# Patient Record
Sex: MALE | Race: OTHER | NOT HISPANIC OR LATINO
[De-identification: names, ages, dates, MRNs, and addresses within clinical notes are randomized per-mention and may not be internally consistent; named-entity substitution may affect disease eponyms.]

---

## 2017-11-03 PROBLEM — Z00.00 ENCOUNTER FOR PREVENTIVE HEALTH EXAMINATION: Status: ACTIVE | Noted: 2017-11-03

## 2020-04-25 ENCOUNTER — MESSAGE (OUTPATIENT)
Age: 30
End: 2020-04-25

## 2021-05-25 ENCOUNTER — EMERGENCY (EMERGENCY)
Facility: HOSPITAL | Age: 31
LOS: 1 days | Discharge: HOME | End: 2021-05-25
Attending: EMERGENCY MEDICINE | Admitting: EMERGENCY MEDICINE
Payer: COMMERCIAL

## 2021-05-25 VITALS
RESPIRATION RATE: 20 BRPM | HEART RATE: 107 BPM | DIASTOLIC BLOOD PRESSURE: 94 MMHG | HEIGHT: 72 IN | OXYGEN SATURATION: 100 % | TEMPERATURE: 98 F | SYSTOLIC BLOOD PRESSURE: 126 MMHG

## 2021-05-25 VITALS
SYSTOLIC BLOOD PRESSURE: 125 MMHG | RESPIRATION RATE: 18 BRPM | HEART RATE: 82 BPM | OXYGEN SATURATION: 100 % | DIASTOLIC BLOOD PRESSURE: 86 MMHG

## 2021-05-25 LAB
ALBUMIN SERPL ELPH-MCNC: 4.5 G/DL — SIGNIFICANT CHANGE UP (ref 3.3–5)
ALP SERPL-CCNC: 85 U/L — SIGNIFICANT CHANGE UP (ref 40–120)
ALT FLD-CCNC: 32 U/L — SIGNIFICANT CHANGE UP (ref 4–41)
ANION GAP SERPL CALC-SCNC: 11 MMOL/L — SIGNIFICANT CHANGE UP (ref 7–14)
AST SERPL-CCNC: 20 U/L — SIGNIFICANT CHANGE UP (ref 4–40)
BASOPHILS # BLD AUTO: 0.09 K/UL — SIGNIFICANT CHANGE UP (ref 0–0.2)
BASOPHILS NFR BLD AUTO: 0.6 % — SIGNIFICANT CHANGE UP (ref 0–2)
BILIRUB SERPL-MCNC: 0.2 MG/DL — SIGNIFICANT CHANGE UP (ref 0.2–1.2)
BUN SERPL-MCNC: 23 MG/DL — SIGNIFICANT CHANGE UP (ref 7–23)
CALCIUM SERPL-MCNC: 9.7 MG/DL — SIGNIFICANT CHANGE UP (ref 8.4–10.5)
CHLORIDE SERPL-SCNC: 101 MMOL/L — SIGNIFICANT CHANGE UP (ref 98–107)
CO2 SERPL-SCNC: 28 MMOL/L — SIGNIFICANT CHANGE UP (ref 22–31)
CREAT SERPL-MCNC: 1.06 MG/DL — SIGNIFICANT CHANGE UP (ref 0.5–1.3)
EOSINOPHIL # BLD AUTO: 0.11 K/UL — SIGNIFICANT CHANGE UP (ref 0–0.5)
EOSINOPHIL NFR BLD AUTO: 0.7 % — SIGNIFICANT CHANGE UP (ref 0–6)
GLUCOSE SERPL-MCNC: 82 MG/DL — SIGNIFICANT CHANGE UP (ref 70–99)
HCT VFR BLD CALC: 49.5 % — SIGNIFICANT CHANGE UP (ref 39–50)
HGB BLD-MCNC: 15.9 G/DL — SIGNIFICANT CHANGE UP (ref 13–17)
IANC: 10.51 K/UL — HIGH (ref 1.5–8.5)
IMM GRANULOCYTES NFR BLD AUTO: 1.7 % — HIGH (ref 0–1.5)
LYMPHOCYTES # BLD AUTO: 18.6 % — SIGNIFICANT CHANGE UP (ref 13–44)
LYMPHOCYTES # BLD AUTO: 2.79 K/UL — SIGNIFICANT CHANGE UP (ref 1–3.3)
MCHC RBC-ENTMCNC: 28.2 PG — SIGNIFICANT CHANGE UP (ref 27–34)
MCHC RBC-ENTMCNC: 32.1 GM/DL — SIGNIFICANT CHANGE UP (ref 32–36)
MCV RBC AUTO: 87.9 FL — SIGNIFICANT CHANGE UP (ref 80–100)
MONOCYTES # BLD AUTO: 1.21 K/UL — HIGH (ref 0–0.9)
MONOCYTES NFR BLD AUTO: 8.1 % — SIGNIFICANT CHANGE UP (ref 2–14)
NEUTROPHILS # BLD AUTO: 10.51 K/UL — HIGH (ref 1.8–7.4)
NEUTROPHILS NFR BLD AUTO: 70.3 % — SIGNIFICANT CHANGE UP (ref 43–77)
NRBC # BLD: 0 /100 WBCS — SIGNIFICANT CHANGE UP
NRBC # FLD: 0 K/UL — SIGNIFICANT CHANGE UP
PLATELET # BLD AUTO: 300 K/UL — SIGNIFICANT CHANGE UP (ref 150–400)
POTASSIUM SERPL-MCNC: 4.2 MMOL/L — SIGNIFICANT CHANGE UP (ref 3.5–5.3)
POTASSIUM SERPL-SCNC: 4.2 MMOL/L — SIGNIFICANT CHANGE UP (ref 3.5–5.3)
PROT SERPL-MCNC: 7.3 G/DL — SIGNIFICANT CHANGE UP (ref 6–8.3)
RBC # BLD: 5.63 M/UL — SIGNIFICANT CHANGE UP (ref 4.2–5.8)
RBC # FLD: 13.1 % — SIGNIFICANT CHANGE UP (ref 10.3–14.5)
SODIUM SERPL-SCNC: 140 MMOL/L — SIGNIFICANT CHANGE UP (ref 135–145)
WBC # BLD: 14.96 K/UL — HIGH (ref 3.8–10.5)
WBC # FLD AUTO: 14.96 K/UL — HIGH (ref 3.8–10.5)

## 2021-05-25 PROCEDURE — 99284 EMERGENCY DEPT VISIT MOD MDM: CPT

## 2021-05-25 NOTE — ED PROVIDER NOTE - PHYSICAL EXAMINATION
Neuro: symmetrical face, CN 2-12 intact, sensation intact, no pronator drift, gait normal, tandem gait normal, finger to nose normal

## 2021-05-25 NOTE — ED ADULT NURSE NOTE - OBJECTIVE STATEMENT
Facilitator RN - Pt. A&Ox4, ambulatory with steady gait. Received as Code Stroke, cancelled by MD Torrez after evaluation. Pt. c/o numbness/tingling to palm of right hand radiating up lower half of right upper extremity that began at 7 pm. All extremities strong & equal. Denies dizziness, weakness, headache, chest pain, n/v/d, abd pain, fever, chills, SOB, PMHx. Respirations even & unlabored on room air. 18G IV inserted in left ac, positive blood return, flushes without difficulty. Report given to primary RN Mio SEXTON

## 2021-05-25 NOTE — ED ADULT NURSE NOTE - CHIEF COMPLAINT QUOTE
pt c/o having right arm numbness/tingling starting at 7pm.pt appears diaphoretic.  pt denies injury. denies chest pain, sob, dizziness, PMH

## 2021-05-25 NOTE — ED PROVIDER NOTE - PATIENT PORTAL LINK FT
You can access the FollowMyHealth Patient Portal offered by [facility] by registering at the following website: [email or web address]. By joining [employer]’s FollowMyHealth portal, you will also be able to view your health information using other applications (apps) compatible with our system.

## 2021-05-25 NOTE — ED PROVIDER NOTE - CLINICAL SUMMARY MEDICAL DECISION MAKING FREE TEXT BOX
numbness from right elbow to hand since 7 pm today. Upon questioning, he was resting his elbows on the table. Initially, was a code stroke but cancelled upon examining him. Labs done and no electrolyte abnormalities noted. Plan for outpatient follow up with neurology.

## 2021-05-25 NOTE — ED PROVIDER NOTE - NSFOLLOWUPINSTRUCTIONS_ED_ALL_ED_FT
Please follow up with neurology as we discussed. A copy of you lab work was provided to you. You were examined today and did not have concerning signs of a stroke. If you develop weakness, difficulty walking or new symptoms of concern, return to the emergency department.

## 2021-05-25 NOTE — ED PROVIDER NOTE - PROGRESS NOTE DETAILS
Shan AMARAL: Discussed results in detail with patient. wbc noted. Patient states he received 2 cortisone shots recently. No fevers, chills, cough, nausea, vomiting. Discussed my impression that this is likely an ulnar neuropathy. Symptoms have improved somewhat since being in the ED. Will refer to neurology. Patient feels comfortable with plan.

## 2021-05-25 NOTE — ED PROVIDER NOTE - OBJECTIVE STATEMENT
Shan AMARAL: Patient is a 32 yo M with no chronic medical problems here for evaluation of right arm numbness from his right elbow to his right hand from 7 pm. Patient denies headache, vision changes, focal weakness, difficulty with walking or talking. No chest pain or shortness of breath. No nausea, vomiting. A CODE STROKE was called from triage. Patient was evaluated at CT Scan with neurology and deemed to have no concerning focal deficit that would be indicative of an acute CVA. CODE STROKE cancelled in agreement with neurology.

## 2021-05-25 NOTE — ED ADULT TRIAGE NOTE - CHIEF COMPLAINT QUOTE
pt c/o having right arm numbness/tingling starting at 7pm.pt appears diaphoretic.  pt denies injury. pt c/o having right arm numbness/tingling starting at 7pm.pt appears diaphoretic.  pt denies injury. denies chest pain, sob, dizziness, PMH

## 2022-04-26 ENCOUNTER — APPOINTMENT (OUTPATIENT)
Dept: ORTHOPEDIC SURGERY | Facility: CLINIC | Age: 32
End: 2022-04-26
Payer: SELF-PAY

## 2022-04-26 VITALS — WEIGHT: 265 LBS | BODY MASS INDEX: 35.89 KG/M2 | HEIGHT: 72 IN

## 2022-04-26 DIAGNOSIS — I10 ESSENTIAL (PRIMARY) HYPERTENSION: ICD-10-CM

## 2022-04-26 DIAGNOSIS — Z78.9 OTHER SPECIFIED HEALTH STATUS: ICD-10-CM

## 2022-04-26 DIAGNOSIS — Z98.890 OTHER SPECIFIED POSTPROCEDURAL STATES: ICD-10-CM

## 2022-04-26 PROCEDURE — 99213 OFFICE O/P EST LOW 20 MIN: CPT

## 2022-04-26 NOTE — PHYSICAL EXAM
[Left] : left knee [5___] : hamstring 5[unfilled]/5 [] : patient ambulates without assistive device [FreeTextEntry3] : well healed scars

## 2022-04-26 NOTE — HISTORY OF PRESENT ILLNESS
[Gradual] : gradual [3] : 3 [0] : 0 [de-identified] : dos 07.02.21\par \par 04/26/22: Here to f/up left knee, now 9 months s/p left knee acl recon. Doing well. \par 01/25/22: Here to f/up left knee, now 6 months s/p left knee acl recon. Doing well. Completed physical therapy. \par 10/25/21: Here to f/up left knee, 3.5 months s/p left knee acl recon. \par 9/23/21: Here for pov now almost 3 months s/p left knee acl recon. Doing well, attending PT. Some difficulty with stairs.\par 8/12/21: POV 3 6 wks s/p left knee acl recon\par 07/23/21: here for pov2 now 3 weeks s/p left knee acl recon. wearing brace. started physical therapy.\par 7/7/21: here for pov1 s/p left knee acl recon. Wearing brace, walking with crutches. denies f/c/s. [FreeTextEntry1] : lt knee  [FreeTextEntry5] :  NISHANT MEYER is a 32 year male who is here today for his lt knee pain. He is doing better since last visit  [de-identified] : none

## 2022-04-26 NOTE — DISCUSSION/SUMMARY
[de-identified] : 32m now 9 months s/p left knee acl recon\par 1) continue with hep and increase activity as tolerated\par 2) rtc 3 months at one year post-op. \par \par \par .vero

## 2022-07-26 ENCOUNTER — APPOINTMENT (OUTPATIENT)
Dept: ORTHOPEDIC SURGERY | Facility: CLINIC | Age: 32
End: 2022-07-26

## 2022-11-04 NOTE — STROKE CODE NOTE - PROVIDER ASSESSMENT_DATE AND TIME
Patient discharged with v/s stable. Written and verbal after care instructions 
about Thoracic pain given and explained. 

Patient alert, oriented and verbalized understanding of instructions. 
Ambulatory with steady gait. All questions addressed prior to discharge. ID 
band removed. Patient advised to follow up with PMD. Rx of Flexeril, Naprosyn 
given. Patient educated on indication of medication including possible reaction 
and side effects. Opportunity to ask questions provided and answered. 25-May-2021 21:22

## 2025-01-15 NOTE — STROKE CODE NOTE - IV ALTEPLASE EXCL REL HIDDEN
show
Bed in lowest position, wheels locked, appropriate side rails in place/Call bell, personal items and telephone in reach/Instruct patient to call for assistance before getting out of bed or chair/Non-slip footwear when patient is out of bed/[place] to call system/Physically safe environment - no spills, clutter or unnecessary equipment/Purposeful Proactive Rounding/Room/bathroom lighting operational, light cord in reach